# Patient Record
Sex: FEMALE | Race: AMERICAN INDIAN OR ALASKA NATIVE
[De-identification: names, ages, dates, MRNs, and addresses within clinical notes are randomized per-mention and may not be internally consistent; named-entity substitution may affect disease eponyms.]

---

## 2017-12-17 ENCOUNTER — HOSPITAL ENCOUNTER (EMERGENCY)
Dept: HOSPITAL 5 - ED | Age: 47
Discharge: HOME | End: 2017-12-17
Payer: COMMERCIAL

## 2017-12-17 VITALS — DIASTOLIC BLOOD PRESSURE: 83 MMHG | SYSTOLIC BLOOD PRESSURE: 141 MMHG

## 2017-12-17 DIAGNOSIS — I10: ICD-10-CM

## 2017-12-17 DIAGNOSIS — E11.9: ICD-10-CM

## 2017-12-17 DIAGNOSIS — K21.9: ICD-10-CM

## 2017-12-17 DIAGNOSIS — L02.411: Primary | ICD-10-CM

## 2017-12-17 PROCEDURE — 99282 EMERGENCY DEPT VISIT SF MDM: CPT

## 2017-12-17 NOTE — EMERGENCY DEPARTMENT REPORT
Abscess Boil HPI





- HPI


Chief Complaint: Skin/Abscess/Foreign Body


Stated Complaint: BOIL


Time Seen by Provider: 12/17/17 20:58


History: Yes Pain, No Fever, No Purulent Drainage, No Numbness, No Foreign Body

, No Previous History, No Insect Bite


HPI: 47-year-old -American female comes in for 3 day history of abscess 

under right arm.  Patient has a past medical history hypertension diabetes 

hyperlipidemia.  She reports that the abscess is tender very painful when she 

moves her arm.  She didn't take an over-the-counter pain medication which she 

says has not really helped since the abscess is getting larger.


Home Medications: 


 Home Medications











 Medication  Instructions  Recorded  Confirmed  Last Taken


 


Ferrous Gluconate [Iron] 236 mg PO BID 03/25/15 04/01/15 03/31/15


 


Levemir Flextouch 30 unit SQ HS 03/25/15 04/01/15 03/31/15


 


Lisinopril 10 mg PO DAILY 03/25/15 04/01/15 04/01/15 05:15


 


NovoLOG Mix 70/30 20 unit SQ QPM 03/25/15 04/01/15 03/31/15


 


NovoLOG Mix 70/30 40 unit SQ QAM 03/25/15 04/01/15 03/31/15


 


Ranitidine HCl [Acid Reducer] 75 mg PO DAILY 03/25/15 04/01/15 04/01/15 05:15








 Previous Rx's











 Medication  Instructions  Recorded  Last Taken  Type


 


Docusate Sodium [Colace] 100 mg PO BID PRN #60 capsule 04/02/15 Unknown Rx


 


Ibuprofen [Motrin] 800 mg PO Q8H PRN #60 tablet 04/02/15 Unknown Rx


 


Meth/Meblue/Sod Phos/Psal/Hyos 1 each PO TID #42 capsule 04/02/15 Unknown Rx





[Uribel Capsule]    


 


Nitrofurantoin Mono/M-Cryst 100 mg PO QDAY #14 capsule 04/02/15 Unknown Rx





[Macrobid]    


 


oxyCODONE /ACETAMINOPHEN [Percocet 1 tab PO Q6HR PRN #45 tablet 04/02/15 

Unknown Rx





5/325]    


 


Acetaminophen/Codeine [Tylenol 1 tab PO Q6H PRN 3 Days #12 tab 12/17/17 Unknown 

Rx





/Codeine # 3 tab]    


 


Cephalexin [Keflex] 500 mg PO BID 10 Days #20 capsule 12/17/17 Unknown Rx











Allergies/Adverse Reactions: 


 Allergies











Allergy/AdvReac Type Severity Reaction Status Date / Time


 


No Known Allergies Allergy   Verified 12/17/17 20:19














ED Review of Systems


ROS: 


Stated complaint: BOIL


Other details as noted in HPI





Comment: All other systems reviewed and negative


Constitutional: denies: chills, fever


Eyes: denies: eye pain, eye discharge, vision change


ENT: denies: ear pain, throat pain


Respiratory: denies: cough, shortness of breath, wheezing


Cardiovascular: denies: chest pain, palpitations


Endocrine: no symptoms reported


Gastrointestinal: denies: abdominal pain, nausea, diarrhea


Genitourinary: denies: urgency, dysuria, discharge


Musculoskeletal: denies: back pain, joint swelling, arthralgia


Skin: lesions


Neurological: denies: headache, weakness, paresthesias


Psychiatric: denies: anxiety, depression


Hematological/Lymphatic: denies: easy bleeding, easy bruising





ED Past Medical Hx





- Past Medical History


Hx Hypertension: Yes (x 7 yrs, lisinopril)


Hx Heart Attack/AMI: No


Hx Congestive Heart Failure: No


Hx Diabetes: Yes


Hx GERD: Yes


Hx Liver Disease: No


Hx Renal Disease: No


Hx Seizures: No


Hx Asthma: No


Hx COPD: No





- Surgical History


Additional Surgical History: Hyst





- Social History


Smoking Status: Never Smoker


Substance Use Type: None





- Medications


Home Medications: 


 Home Medications











 Medication  Instructions  Recorded  Confirmed  Last Taken  Type


 


Ferrous Gluconate [Iron] 236 mg PO BID 03/25/15 04/01/15 03/31/15 History


 


Levemir Flextouch 30 unit SQ HS 03/25/15 04/01/15 03/31/15 History


 


Lisinopril 10 mg PO DAILY 03/25/15 04/01/15 04/01/15 05:15 History


 


NovoLOG Mix 70/30 20 unit SQ QPM 03/25/15 04/01/15 03/31/15 History


 


NovoLOG Mix 70/30 40 unit SQ QAM 03/25/15 04/01/15 03/31/15 History


 


Ranitidine HCl [Acid Reducer] 75 mg PO DAILY 03/25/15 04/01/15 04/01/15 05:15 

History


 


Docusate Sodium [Colace] 100 mg PO BID PRN #60 capsule 04/02/15  Unknown Rx


 


Ibuprofen [Motrin] 800 mg PO Q8H PRN #60 tablet 04/02/15  Unknown Rx


 


Meth/Meblue/Sod Phos/Psal/Hyos 1 each PO TID #42 capsule 04/02/15  Unknown Rx





[Uribel Capsule]     


 


Nitrofurantoin Mono/M-Cryst 100 mg PO QDAY #14 capsule 04/02/15  Unknown Rx





[Macrobid]     


 


oxyCODONE /ACETAMINOPHEN [Percocet 1 tab PO Q6HR PRN #45 tablet 04/02/15  

Unknown Rx





5/325]     


 


Acetaminophen/Codeine [Tylenol 1 tab PO Q6H PRN 3 Days #12 tab 12/17/17  

Unknown Rx





/Codeine # 3 tab]     


 


Cephalexin [Keflex] 500 mg PO BID 10 Days #20 capsule 12/17/17  Unknown Rx














ED Abscess Boil Physical Exam





- Exam


General: 


Vital signs noted. No distress. Alert and acting appropriately.





Front/Back of Body, Lg (Color): 


  __________________________














  __________________________





 1 - 5 cm abscess





Size: 5 cm


Exam: Yes Tenderness, Yes Fluctuance, Yes Normal Neurologic Exam, Yes Normal 

Circulation, No Surrounding Cellulites/Erythema, No Crepitation, No Heart Murmur





ED Course


 Vital Signs











  12/17/17





  20:20


 


Temperature 97.6 F


 


Pulse Rate 86


 


Respiratory 20





Rate 


 


Blood Pressure 141/83


 


O2 Sat by Pulse 98





Oximetry 











Critical care attestation.: 


If time is entered above; I have spent that time in minutes in the direct care 

of this critically ill patient, excluding procedure time.








ED Medical Decision Making





- Medical Decision Making





Patient has been evaluated by this provider fast track.  Discussed the patient 

will need to incision and drain the abscess under her right axillary.  

Discussed with patient that we will place her on antibiotics and have her 

return in 2-3 days for wound packing removal.  Patient verbalized understanding.





ED Disposition


Clinical Impression: 


 Abscess of right axilla





Disposition: DC-01 TO HOME OR SELFCARE


Is pt being admited?: No


Does the pt Need Aspirin: No


Condition: Stable


Instructions:  Abscess (ED)


Additional Instructions: 


Complete antibiotic as prescribed.  Return to the emergency room for packing 

change in 2 days.  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  He can follow-up with her primary care 

doctor in 3-5 days.


Prescriptions: 


Acetaminophen/Codeine [Tylenol /Codeine # 3 tab] 1 tab PO Q6H PRN 3 Days #12 tab


 PRN Reason: Pain


Cephalexin [Keflex] 500 mg PO BID 10 Days #20 capsule


Referrals: 


REBECCA VEGA MD [Primary Care Provider] - 3-5 Days


Forms:  Work/School Release Form(ED), Accompanied Note

## 2017-12-20 ENCOUNTER — HOSPITAL ENCOUNTER (EMERGENCY)
Dept: HOSPITAL 5 - ED | Age: 47
Discharge: HOME | End: 2017-12-20
Payer: COMMERCIAL

## 2017-12-20 VITALS — SYSTOLIC BLOOD PRESSURE: 100 MMHG | DIASTOLIC BLOOD PRESSURE: 76 MMHG

## 2017-12-20 DIAGNOSIS — I10: ICD-10-CM

## 2017-12-20 DIAGNOSIS — K21.9: ICD-10-CM

## 2017-12-20 DIAGNOSIS — Z48.01: Primary | ICD-10-CM

## 2017-12-20 DIAGNOSIS — E11.9: ICD-10-CM

## 2017-12-20 NOTE — EMERGENCY DEPARTMENT REPORT
- General


Chief Complaint: Skin/Abscess/Foreign Body


Stated Complaint: PACKING REMOVAL


Time Seen by Provider: 12/20/17 17:58


Source: patient


Mode of arrival: Ambulatory


Limitations: No Limitations





- History of Present Illness


Initial Comments: 


Patient is a 47-year-old female who presents to ED for wound packing removal 

from the right axilla.  Patient states abscess was drained on Sunday, 3 days 

ago and packing was placed on Sunday.  Patient states she's been changing the 

wound daily.  Signed she denies fevers/chills/nausea vomiting or any other 

problems.








- Related Data


 Home Medications











 Medication  Instructions  Recorded  Confirmed  Last Taken


 


Ferrous Gluconate [Iron] 236 mg PO BID 03/25/15 04/01/15 03/31/15


 


Levemir Flextouch 30 unit SQ HS 03/25/15 04/01/15 03/31/15


 


Lisinopril 10 mg PO DAILY 03/25/15 04/01/15 04/01/15 05:15


 


NovoLOG Mix 70/30 20 unit SQ QPM 03/25/15 04/01/15 03/31/15


 


NovoLOG Mix 70/30 40 unit SQ QAM 03/25/15 04/01/15 03/31/15


 


Ranitidine HCl [Acid Reducer] 75 mg PO DAILY 03/25/15 04/01/15 04/01/15 05:15








 Previous Rx's











 Medication  Instructions  Recorded  Last Taken  Type


 


Docusate Sodium [Colace] 100 mg PO BID PRN #60 capsule 04/02/15 Unknown Rx


 


Ibuprofen [Motrin] 800 mg PO Q8H PRN #60 tablet 04/02/15 Unknown Rx


 


Meth/Meblue/Sod Phos/Psal/Hyos 1 each PO TID #42 capsule 04/02/15 Unknown Rx





[Uribel Capsule]    


 


Nitrofurantoin Mono/M-Cryst 100 mg PO QDAY #14 capsule 04/02/15 Unknown Rx





[Macrobid]    


 


oxyCODONE /ACETAMINOPHEN [Percocet 1 tab PO Q6HR PRN #45 tablet 04/02/15 

Unknown Rx





5/325]    


 


Acetaminophen/Codeine [Tylenol 1 tab PO Q6H PRN 3 Days #12 tab 12/17/17 Unknown 

Rx





/Codeine # 3 tab]    


 


Cephalexin [Keflex] 500 mg PO BID 10 Days #20 capsule 12/17/17 Unknown Rx











 Allergies











Allergy/AdvReac Type Severity Reaction Status Date / Time


 


No Known Allergies Allergy   Verified 12/20/17 14:51














ED Review of Systems


ROS: 


Stated complaint: PACKING REMOVAL


Other details as noted in HPI





Constitutional: denies: chills, fever


Eyes: denies: eye pain, eye discharge, vision change


ENT: denies: ear pain, throat pain


Respiratory: denies: cough, shortness of breath, wheezing


Cardiovascular: denies: chest pain, palpitations


Endocrine: no symptoms reported


Gastrointestinal: denies: abdominal pain, nausea, diarrhea


Genitourinary: denies: urgency, dysuria, discharge


Musculoskeletal: denies: back pain, joint swelling, arthralgia


Skin: denies: rash, lesions


Neurological: denies: headache, weakness, paresthesias


Psychiatric: denies: anxiety, depression


Hematological/Lymphatic: denies: easy bleeding, easy bruising





ED Past Medical Hx





- Past Medical History


Previous Medical History?: Yes


Hx Hypertension: Yes (x 7 yrs, lisinopril)


Hx Heart Attack/AMI: No


Hx Congestive Heart Failure: No


Hx Diabetes: Yes


Hx GERD: Yes


Hx Liver Disease: No


Hx Renal Disease: No


Hx Seizures: No


Hx Asthma: No


Hx COPD: No





- Surgical History


Past Surgical History?: Yes


Additional Surgical History: Hyst





- Social History


Smoking Status: Never Smoker


Substance Use Type: None





- Medications


Home Medications: 


 Home Medications











 Medication  Instructions  Recorded  Confirmed  Last Taken  Type


 


Ferrous Gluconate [Iron] 236 mg PO BID 03/25/15 04/01/15 03/31/15 History


 


Levemir Flextouch 30 unit SQ HS 03/25/15 04/01/15 03/31/15 History


 


Lisinopril 10 mg PO DAILY 03/25/15 04/01/15 04/01/15 05:15 History


 


NovoLOG Mix 70/30 20 unit SQ QPM 03/25/15 04/01/15 03/31/15 History


 


NovoLOG Mix 70/30 40 unit SQ QAM 03/25/15 04/01/15 03/31/15 History


 


Ranitidine HCl [Acid Reducer] 75 mg PO DAILY 03/25/15 04/01/15 04/01/15 05:15 

History


 


Docusate Sodium [Colace] 100 mg PO BID PRN #60 capsule 04/02/15  Unknown Rx


 


Ibuprofen [Motrin] 800 mg PO Q8H PRN #60 tablet 04/02/15  Unknown Rx


 


Meth/Meblue/Sod Phos/Psal/Hyos 1 each PO TID #42 capsule 04/02/15  Unknown Rx





[Uribel Capsule]     


 


Nitrofurantoin Mono/M-Cryst 100 mg PO QDAY #14 capsule 04/02/15  Unknown Rx





[Macrobid]     


 


oxyCODONE /ACETAMINOPHEN [Percocet 1 tab PO Q6HR PRN #45 tablet 04/02/15  

Unknown Rx





5/325]     


 


Acetaminophen/Codeine [Tylenol 1 tab PO Q6H PRN 3 Days #12 tab 12/17/17  

Unknown Rx





/Codeine # 3 tab]     


 


Cephalexin [Keflex] 500 mg PO BID 10 Days #20 capsule 12/17/17  Unknown Rx














ED Physical Exam





- General


Limitations: No Limitations


General appearance: alert, in no apparent distress





- Head


Head exam: Present: atraumatic, normocephalic





- Eye


Eye exam: Present: normal appearance, PERRL





- ENT


ENT exam: Present: mucous membranes moist





- Neck


Neck exam: Present: normal inspection





- Respiratory


Respiratory exam: Present: normal lung sounds bilaterally.  Absent: respiratory 

distress, wheezes, rales, rhonchi





- Cardiovascular


Cardiovascular Exam: Present: regular rate, normal rhythm.  Absent: systolic 

murmur, diastolic murmur, rubs, gallop





- GI/Abdominal


GI/Abdominal exam: Present: soft, normal bowel sounds





- Extremities Exam


Extremities exam: Present: normal inspection





- Back Exam


Back exam: Present: normal inspection





- Neurological Exam


Neurological exam: Present: alert, oriented X3, CN II-XII intact





- Psychiatric


Psychiatric exam: Present: normal affect, normal mood





- Skin


Skin exam: Present: warm, dry, intact, normal color, other (wound looks mod 

healed no pus d/c).  Absent: rash





ED Course


 Vital Signs











  12/20/17





  14:51


 


Temperature 98 F


 


Pulse Rate 98 H


 


Respiratory 18





Rate 


 


Blood Pressure 100/76


 


O2 Sat by Pulse 94





Oximetry 














ED Medical Decision Making





- Medical Decision Making


47-year-old female presents with wound check and packing removal


ED course: Packing was removed from right axilla, wound cleaned and flushed.


No sign of infection.  Thoroughly tripped


I discussed the patient acute wound care not to take care of the wound properly 

to his heel.  


I discussed with the patient follow-up primary care physician for continued 

wound checks.


The patient tolerated procedure well she is in no acute distress


Vital signs are normal.





Critical care attestation.: 


If time is entered above; I have spent that time in minutes in the direct care 

of this critically ill patient, excluding procedure time.








ED Disposition


Clinical Impression: 


 Wound check, abscess





Disposition: DC-01 TO HOME OR SELFCARE


Is pt being admited?: No


Does the pt Need Aspirin: No


Condition: Stable


Instructions:  Acute Wound Care (ED), Sitz Bath (GEN)


Additional Instructions: 


Make sure to follow up with the primary care physician as discussed.


Take all your medications as you've been prescribed.


If you have any worsening symptoms or develop new symptoms please return to ED 

immediately.


Referrals: 


REBECCA VEGA MD [Primary Care Provider] - 3-5 Days


Forms:  Accompanied Note, Work/School Release Form(ED)


Time of Disposition: 18:00

## 2018-03-07 ENCOUNTER — HOSPITAL ENCOUNTER (OUTPATIENT)
Dept: HOSPITAL 5 - MAMMO | Age: 48
Discharge: HOME | End: 2018-03-07
Attending: NURSE PRACTITIONER
Payer: COMMERCIAL

## 2018-03-07 DIAGNOSIS — K21.9: ICD-10-CM

## 2018-03-07 DIAGNOSIS — Z12.31: Primary | ICD-10-CM

## 2018-03-07 DIAGNOSIS — I10: ICD-10-CM

## 2018-03-07 PROCEDURE — 77067 SCR MAMMO BI INCL CAD: CPT

## 2018-03-07 NOTE — MAMMOGRAPHY REPORT
BILATERAL MAMMOGRAM:



FINDINGS:

The breasts are almost entirely fat (<25% glandular).  No mass,

distortion, suspicious calcification, or skin change is seen. No 

significant change when compared to prior exam in November 2016.



CAD was utilized.



IMPRESSION:

Negative mammogram.  There is no mammographic evidence of

malignancy.



RECOMMENDATION:

Follow-up per ACS guidelines.



BI-RADS CATEGORY: 1 = Negative



ACR BI-RADS MAMMOGRAPHIC CODES:

0 = Needs additional imaging evaluation; 1 = Negative; 2 = Benign;

3 = Probably benign; 4 = Suspicious; 5 = Malignant; 6 = Known

biopsy-proven malignancy



COMMENT:

      1.   Dense breast tissue, i.e., adenosis, fibrocystic 

            changes, etc., may obscure an underlying neoplasm.

      2.   Approximately 10% of cancers are not detected with

            mammography.

      3.   A negative mammography report should not delay biopsy 

            if a clinically suspicious mass is present.



COMMENT:

Patient follow-up letters are generated in [employer].

## 2018-06-21 ENCOUNTER — HOSPITAL ENCOUNTER (OUTPATIENT)
Dept: HOSPITAL 5 - GIO | Age: 48
Discharge: HOME | End: 2018-06-21
Attending: INTERNAL MEDICINE
Payer: COMMERCIAL

## 2018-06-21 VITALS — SYSTOLIC BLOOD PRESSURE: 144 MMHG | DIASTOLIC BLOOD PRESSURE: 61 MMHG

## 2018-06-21 DIAGNOSIS — E78.00: ICD-10-CM

## 2018-06-21 DIAGNOSIS — E11.9: ICD-10-CM

## 2018-06-21 DIAGNOSIS — K31.89: Primary | ICD-10-CM

## 2018-06-21 DIAGNOSIS — I10: ICD-10-CM

## 2018-06-21 DIAGNOSIS — Z79.899: ICD-10-CM

## 2018-06-21 DIAGNOSIS — E66.9: ICD-10-CM

## 2018-06-21 DIAGNOSIS — Z90.710: ICD-10-CM

## 2018-06-21 DIAGNOSIS — K44.9: ICD-10-CM

## 2018-06-21 DIAGNOSIS — K21.9: ICD-10-CM

## 2018-06-21 PROCEDURE — 43239 EGD BIOPSY SINGLE/MULTIPLE: CPT

## 2018-06-21 PROCEDURE — 88342 IMHCHEM/IMCYTCHM 1ST ANTB: CPT

## 2018-06-21 PROCEDURE — 88305 TISSUE EXAM BY PATHOLOGIST: CPT

## 2018-06-21 PROCEDURE — 82962 GLUCOSE BLOOD TEST: CPT

## 2018-06-21 NOTE — OPERATIVE REPORT
Operative Report


Operative Report: 


Date: 06/21/2018 


   


Operative Report: 





Date of procedure: 06/21/2018 





Procedure: Esophagogastroduodenoscopy with multiple mucosal biopsies.





Attending physician: Behzad Bates MD





: Behzad Bates MD





Indication: Patient is a 48 -year-old female who presented with a history of 

recurrent epigastric pain, heartburn and indigestion.  An upper endoscopy is 

done to assess patient, so that treatment may be directed based on the findings.





Consent: Informed consent was obtained after advising the patient and family 

regarding nature of this procedure, its indications, potential benefits as well 

as possible complications including but not limited to bleeding perforation and 

adverse reaction to medication, infection as well as other cardiopulmonary 

complications.  An informed written and verbal consent was then obtained after 

due opportunity was provided for questions and answers.





Monitoring: Patient was monitored continuously with pulse oximetry and 

electrocardiographic recordings as well as blood pressure recordings.  Vital 

signs remained stable throughout this procedure with no untoward events.





Preoperative assessment: Patient was assessed immediately prior to this 

procedure for capacity to tolerate monitored anesthesia care and moderate 

sedation as well as general anesthesia.  Patient's ASA classification is 2,  

Mallampati class is 2, Hyomental distance is 3.





Instrument: [employer] video endoscope





Medications: Propofol given intravenously in divided doses.  For details please 

refer to anesthesia records.





Description of procedure: Patient was placed in the left lateral decubitus 

position after achieving sedation, the endoscope was introduced into the 

esophagus under direct vision.  It was then advanced beyond the esophagus into 

the stomach and then beyond the stomach into the duodenum and to the second 

portion of the duodenum.  It was subsequently withdrawn with careful inspection 

of all mucosal surfaces with the following findings.





Findings: Patient has  an irregular Z line at 38 cm.  There was a sliding 

hiatal hernia seen on entry into the stomach.  There was erythema in the 

gastric antrum.   Biopsies of the antrum were obtained for histopathology.  The 

duodenum was normal to second portion.





Impression: Irregular Z line. 


Gastric antral erythema 


Hiatal hernia.








Plan: Continue treatment with proton pump inhibitors.


Follow pathology report.


Direct additional treatment based on the pathology report.

## 2018-06-21 NOTE — ANESTHESIA CONSULTATION
Anesthesia Consult and Med Hx


Date of service: 06/21/18





- Airway


Anesthetic Teeth Evaluation: Poor (missing teeth), Dentures (upper)


ROM Head & Neck: Adequate


Mental/Hyoid Distance: Adequate


Mallampati Class: Class III


Intubation Access Assessment: Possibly Difficult





- Pre-Operative Health Status


ASA Pre-Surgery Classification: ASA3


Proposed Anesthetic Plan: MAC





- Pulmonary


Hx Smoking: No


Hx Asthma: No


COPD: No


Hx Pneumonia: No


Hx Sleep Apnea: No





- Cardiovascular System


Hx Hypertension: Yes


Hx Coronary Artery Disease: No


Hx Heart Attack/AMI: No


Hx Angina: No (>4METs)





- Central Nervous System


Hx Seizures: No


CVA: No


Hx Psychiatric Problems: No





- Gastrointestinal


Hx Gastroesophageal Reflux Disease: Yes (told to take daily omeprazole DOS)





- Endocrine


Hx Renal Disease: No


Hx End Stage Renal Disease: No


Hx Liver Disease: No


Hx Insulin Dependent Diabetes: Yes


Hx Non-Insulin Dependent Diabetes: No


Hx Thyroid Disease: No





- Hematic


Hx Anemia: Yes





- Other Systems


Hx Alcohol Use: Yes (occas)


Hx Substance Use: Yes (marijuana usually twice per day)


Hx Cancer: No


Hx Obesity: Yes (BMI 35.5)

## 2018-06-21 NOTE — DISCHARGE SUMMARY
Short Stay Discharge Plan


Activity: advance as tolerated


Weight Bearing Status: Weight Bear as Tolerated


Diet: regular


Follow up with: 


TOMMIE PENA NP [Primary Care Provider] - 7 Days

## 2019-03-08 ENCOUNTER — HOSPITAL ENCOUNTER (OUTPATIENT)
Dept: HOSPITAL 5 - MAMMO | Age: 49
Discharge: HOME | End: 2019-03-08
Attending: NURSE PRACTITIONER
Payer: SELF-PAY

## 2019-03-08 DIAGNOSIS — Z90.710: ICD-10-CM

## 2019-03-08 DIAGNOSIS — K21.9: ICD-10-CM

## 2019-03-08 DIAGNOSIS — E78.00: ICD-10-CM

## 2019-03-08 DIAGNOSIS — E11.9: ICD-10-CM

## 2019-03-08 DIAGNOSIS — Z12.31: Primary | ICD-10-CM

## 2019-03-08 DIAGNOSIS — E66.9: ICD-10-CM

## 2019-03-08 DIAGNOSIS — I10: ICD-10-CM

## 2019-03-08 PROCEDURE — 77067 SCR MAMMO BI INCL CAD: CPT

## 2019-03-08 NOTE — MAMMOGRAPHY REPORT
BILATERAL DIGITAL SCREENING MAMMOGRAM with CAD: 03/08/19 07:32:00



CLINICAL: Routine screening.



COMPARISON:03/07/18 and 11/08/16



FINDINGS: The breasts are almost entirely fatty. No mass, architectural 

distortion or suspicious calcifications.



IMPRESSION: No mammographic evidence of malignancy.



BI-RADS CATEGORY:  2 -- Benign



RECOMMENDATION: Routine mammographic screening in one year.





COMMENT:

Patient follow-up letters are generated by our [employer] application.

## 2020-06-17 ENCOUNTER — HOSPITAL ENCOUNTER (OUTPATIENT)
Dept: HOSPITAL 5 - MAMMO | Age: 50
Discharge: HOME | End: 2020-06-17
Attending: NURSE PRACTITIONER
Payer: COMMERCIAL

## 2020-06-17 DIAGNOSIS — N64.89: ICD-10-CM

## 2020-06-17 DIAGNOSIS — Z12.31: Primary | ICD-10-CM

## 2020-06-17 PROCEDURE — 77067 SCR MAMMO BI INCL CAD: CPT

## 2020-06-17 NOTE — MAMMOGRAPHY REPORT
BILATERAL DIGITAL SCREENING MAMMOGRAM WITH CAD 

 

HISTORY: Screening mammogram.



TECHNIQUE:  Routine digital mammographic imaging performed.  This examination was interpreted with abdiel cortez benefit of Computer-aided Detection analysis.



COMPARISON: 3/8/2019, 3/7/2018, 11/8/2016.



FINDINGS: 



Breast Density: scattered fibroglandular appearance of the breast tissue.



Digital CC and MLO views demonstrate no mammographic evidence of malignancy.  A left lower inner pito
st focal asymmetry is stable. Long-term stability would support a benign etiology.





IMPRESSION:



No mammographic evidence of malignancy.  If the clinical examination remains stable, recommend bilate
ral mammogram in approximately one year.



BIRADS 2:  Benign Finding(s).





FURTHER INFORMATION:  According to the American College of Radiology, yearly mammograms are recommend
ed starting at age 40 and continuing as long as a woman is in good health. Clinical Breast Exams shou
ld be part of a periodic health exam-about every 3 years for women in their 20s and 30s and every yea
r for women 40 and over. Breast self exam is an option for women starting in their 20s. Any breast ch
boom noted on a breast self exam should be reported promptly to the patient's healthcare provider. Br
east MRI is recommended for women with an approximately 20-25% or greater lifetime risk of breast can
cer, including women with a strong family history of breast or ovarian cancer and women who have been
 treated for Hodgkin's disease.



A negative Mammography report should not discourage follow up or biopsy of a clinically significant f
inding and/or abnormality.



Dense breast tissue may obscure small neoplasms.  



The patient will be entered into a reminder system with a target due date for the next screening mamm
ogram.



Signer Name: Jaswinder De Paz MD 

Signed: 6/17/2020 1:48 PM

Workstation Name: [id number]

## 2021-06-18 ENCOUNTER — HOSPITAL ENCOUNTER (OUTPATIENT)
Dept: HOSPITAL 5 - MAMMO | Age: 51
Discharge: HOME | End: 2021-06-18
Attending: NURSE PRACTITIONER
Payer: COMMERCIAL

## 2021-06-18 DIAGNOSIS — Z12.31: Primary | ICD-10-CM

## 2021-06-18 PROCEDURE — 77067 SCR MAMMO BI INCL CAD: CPT

## 2021-06-22 NOTE — MAMMOGRAPHY REPORT
DIGITAL SCREENING MAMMOGRAM WITH CAD, 6/22/2021



CLINICAL INFORMATION / INDICATION: Routine screening mammography. [facility] MAMMO



TECHNIQUE:  Digital bilateral 2D mammography was obtained in the craniocaudal and mediolateral obliqu
e projections. This examination was interpreted with the benefit of Computer-Aided Detection analysis
.



COMPARISON: 6/17/2020. 3/8/2019. 3/7/2018.



FINDINGS: 



Breast Density: There are scattered areas of fibroglandular density.



No dominant mass, suspicious calcifications, or architectural distortion in either breast. 





 

IMPRESSION: No mammographic evidence of malignancy.



Follow up recommendation: Routine yearly



BI-RADS Category 1:  Negative.





-------------------------------------------------------------------------------------------

A "normal" or negative report should not discourage follow up or biopsy of a clinically significant f
inding.



A written summary of these findings will be mailed to the patient. The patient will be entered into a
 mammography reporting system which will generate a reminder letter for the patient's next appointmen
t at the appropriate interval.



The American College of Radiology recommends yearly mammograms starting at age 40 and continuing as l
abril as a woman is in good health.  Breast MRI is recommended for women with an approximate 20-25% or 
greater lifetime risk of breast cancer, including women with a strong family history of breast or ova
winnie cancer or who have been treated for Hodgkin's disease.



Signer Name: Rich Carmona MD 

Signed: 6/22/2021 10:02 AM

Workstation Name: [email or web address]

## 2022-05-18 ENCOUNTER — DASHBOARD ENCOUNTERS (OUTPATIENT)
Age: 52
End: 2022-05-18

## 2022-05-18 ENCOUNTER — OFFICE VISIT (OUTPATIENT)
Dept: URBAN - METROPOLITAN AREA CLINIC 109 | Facility: CLINIC | Age: 52
End: 2022-05-18
Payer: COMMERCIAL

## 2022-05-18 ENCOUNTER — LAB OUTSIDE AN ENCOUNTER (OUTPATIENT)
Dept: URBAN - METROPOLITAN AREA CLINIC 109 | Facility: CLINIC | Age: 52
End: 2022-05-18

## 2022-05-18 DIAGNOSIS — Z12.11 SCREENING FOR COLORECTAL CANCER: ICD-10-CM

## 2022-05-18 DIAGNOSIS — R10.84 GENERALIZED ABDOMINAL PAIN: ICD-10-CM

## 2022-05-18 DIAGNOSIS — Z85.9 HISTORY OF MALIGNANT NEUROENDOCRINE TUMOR: ICD-10-CM

## 2022-05-18 PROCEDURE — 99203 OFFICE O/P NEW LOW 30 MIN: CPT | Performed by: INTERNAL MEDICINE

## 2022-05-18 NOTE — HPI-TODAY'S VISIT:
The patient presents for evaluation of abd pain and screening colonoscopy.  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  has had abd pain occasionally, sporadic and changes position.  denies pain with meals, n/v, gi bleeding.  denies prior colonoscopy.

## 2022-06-28 ENCOUNTER — OFFICE VISIT (OUTPATIENT)
Dept: URBAN - METROPOLITAN AREA SURGERY CENTER 23 | Facility: SURGERY CENTER | Age: 52
End: 2022-06-28

## 2022-06-30 PROBLEM — 266987004: Status: ACTIVE | Noted: 2022-05-18

## 2022-06-30 PROBLEM — 275978004 SCREENING FOR MALIGNANT NEOPLASM OF COLON: Status: ACTIVE | Noted: 2022-05-18

## 2022-08-02 ENCOUNTER — OFFICE VISIT (OUTPATIENT)
Dept: URBAN - METROPOLITAN AREA SURGERY CENTER 23 | Facility: SURGERY CENTER | Age: 52
End: 2022-08-02
Payer: COMMERCIAL

## 2022-08-02 DIAGNOSIS — Z12.11 COLON CANCER SCREENING: ICD-10-CM

## 2022-08-02 PROCEDURE — 45378 DIAGNOSTIC COLONOSCOPY: CPT | Performed by: INTERNAL MEDICINE

## 2022-08-02 PROCEDURE — G8907 PT DOC NO EVENTS ON DISCHARG: HCPCS | Performed by: INTERNAL MEDICINE
